# Patient Record
Sex: FEMALE | Race: WHITE | NOT HISPANIC OR LATINO | Employment: OTHER | URBAN - METROPOLITAN AREA
[De-identification: names, ages, dates, MRNs, and addresses within clinical notes are randomized per-mention and may not be internally consistent; named-entity substitution may affect disease eponyms.]

---

## 2021-12-14 ENCOUNTER — OFFICE VISIT (OUTPATIENT)
Dept: GASTROENTEROLOGY | Facility: CLINIC | Age: 66
End: 2021-12-14

## 2021-12-14 ENCOUNTER — TELEPHONE (OUTPATIENT)
Dept: GASTROENTEROLOGY | Facility: CLINIC | Age: 66
End: 2021-12-14

## 2021-12-14 VITALS — HEIGHT: 64 IN | TEMPERATURE: 97.2 F | BODY MASS INDEX: 41.32 KG/M2 | WEIGHT: 242 LBS

## 2021-12-14 DIAGNOSIS — Z91.09 OTHER ALLERGY STATUS, OTHER THAN TO DRUGS AND BIOLOGICAL SUBSTANCES: ICD-10-CM

## 2021-12-14 DIAGNOSIS — R15.2 INCONTINENCE OF FECES WITH FECAL URGENCY: ICD-10-CM

## 2021-12-14 DIAGNOSIS — R10.10 PAIN OF UPPER ABDOMEN: ICD-10-CM

## 2021-12-14 DIAGNOSIS — R15.9 INCONTINENCE OF FECES WITH FECAL URGENCY: ICD-10-CM

## 2021-12-14 DIAGNOSIS — R19.7 DIARRHEA, UNSPECIFIED TYPE: ICD-10-CM

## 2021-12-14 DIAGNOSIS — R68.81 EARLY SATIETY: ICD-10-CM

## 2021-12-14 DIAGNOSIS — R19.4 CHANGE IN BOWEL HABITS: Primary | ICD-10-CM

## 2021-12-14 PROCEDURE — 99204 OFFICE O/P NEW MOD 45 MIN: CPT | Performed by: NURSE PRACTITIONER

## 2021-12-14 RX ORDER — PRIMIDONE 50 MG/1
100 TABLET ORAL NIGHTLY
COMMUNITY
Start: 2021-11-15

## 2021-12-14 RX ORDER — FLUOXETINE HYDROCHLORIDE 40 MG/1
40 CAPSULE ORAL DAILY
COMMUNITY
Start: 2021-10-05

## 2021-12-14 RX ORDER — ATORVASTATIN CALCIUM 40 MG/1
40 TABLET, FILM COATED ORAL DAILY
COMMUNITY
Start: 2021-10-02

## 2021-12-14 RX ORDER — CYANOCOBALAMIN 1000 UG/ML
1000 INJECTION, SOLUTION INTRAMUSCULAR; SUBCUTANEOUS
COMMUNITY
Start: 2021-09-17

## 2021-12-14 RX ORDER — CLOPIDOGREL BISULFATE 75 MG/1
75 TABLET ORAL DAILY
COMMUNITY
Start: 2021-12-09

## 2021-12-14 RX ORDER — GABAPENTIN 100 MG/1
100 CAPSULE ORAL DAILY
COMMUNITY
Start: 2021-11-15

## 2021-12-14 NOTE — TELEPHONE ENCOUNTER
anastasia Azar for 01/04 arrive at [facility],egd/patient procedure packet was given to pt in office on 12/14 pt was advised time of arrival is subject to change, [facility] will call for w/finale time

## 2021-12-14 NOTE — PROGRESS NOTES
Chief Complaint   Patient presents with   • Diarrhea       HPI    Annabel Michaels is a  66 y.o. female here to establish care as a new patient for complaints of abdominal pain with change in bowel habits and fecal incontinence.    This patient will also follow with Dr. Cannon.    Patient reports approximately 4 months of change in bowel habits with episodic diarrhea diarrhea becoming more frequent.  There is lower abdominal discomfort characterized as a gas bloating aching type sensation that comes and goes.  Occasional fecal incontinence.    She experiences epigastric pain, early satiety, and heartburn.  Some relief with OTC PPI therapy.    She is diabetic and reports poor glycemic control.    She has a history of gallbladder removal.    She has never had an endoscopic examination.    She is on Plavix for history of coronary artery disease and CVA.    Past Medical History:   Diagnosis Date   • Coronary artery disease    • CVA (cerebral vascular accident) (HCC)    • Diverticulitis of colon    • Hyperlipidemia    • Hypertension    • Irritable bowel syndrome        Past Surgical History:   Procedure Laterality Date   • CATARACT EXTRACTION     •  SECTION      x 3   • CHOLECYSTECTOMY     • KNEE ARTHROSCOPY     • WISDOM TOOTH EXTRACTION         Scheduled Meds:     Continuous Infusions: No current facility-administered medications for this visit.      PRN Meds:     Allergies   Allergen Reactions   • Penicillins Hives and Swelling       Social History     Socioeconomic History   • Marital status:    Tobacco Use   • Smoking status: Former Smoker     Packs/day: 0.50     Types: Cigarettes     Start date:      Quit date: 2016     Years since quittin.9   • Smokeless tobacco: Never Used   Substance and Sexual Activity   • Alcohol use: Yes     Comment: seldom   • Drug use: Never       Family History   Problem Relation Age of Onset   • Irritable bowel syndrome Mother    • Diverticulitis Sister    •  Diverticulitis Brother        Review of Systems   Constitutional: Negative for activity change, appetite change, fatigue, fever and unexpected weight change.   HENT: Negative for trouble swallowing.    Eyes: Negative.    Respiratory: Negative.  Negative for apnea, cough, choking, chest tightness, shortness of breath and wheezing.    Cardiovascular: Negative.  Negative for chest pain, palpitations and leg swelling.   Gastrointestinal: Positive for abdominal pain and diarrhea. Negative for abdominal distention, anal bleeding, blood in stool, constipation, nausea, rectal pain and vomiting.   Endocrine: Negative.    Genitourinary: Negative.    Musculoskeletal: Negative.    Allergic/Immunologic: Negative.    Neurological: Negative.    Hematological: Negative.    Psychiatric/Behavioral: Negative.        Vitals:    12/14/21 1430   Temp: 97.2 °F (36.2 °C)       Physical Exam  Constitutional:       Appearance: She is well-developed.   Abdominal:      General: Bowel sounds are normal. There is no distension.      Palpations: Abdomen is soft. There is no mass.      Tenderness: There is no abdominal tenderness. There is no guarding.      Hernia: No hernia is present.   Skin:     General: Skin is warm and dry.      Capillary Refill: Capillary refill takes less than 2 seconds.   Neurological:      Mental Status: She is alert and oriented to person, place, and time.   Psychiatric:         Behavior: Behavior normal.     Assessment    Diagnoses and all orders for this visit:    1. Change in bowel habits (Primary)  Overview:  Added automatically from request for surgery [id number]    Orders:  -     Case Request; Standing  -     Case Request  -     CBC & Differential  -     Comprehensive Metabolic Panel  -     C-reactive Protein  -     Sedimentation Rate  -     Celiac Comprehensive Panel  -     Food Allergy Profile  -     TSH  -     Stool Culture (Reference Lab) - Stool, Per Rectum    2. Pain of upper abdomen  Overview:  Added  automatically from request for surgery [id number]    Orders:  -     Case Request; Standing  -     Case Request    3. Early satiety  Overview:  Added automatically from request for surgery [id number]    Orders:  -     Case Request; Standing  -     Case Request    4. Incontinence of feces with fecal urgency  Overview:  Added automatically from request for surgery [id number]    Orders:  -     Case Request; Standing  -     Case Request  -     Clostridium Difficile Toxin, PCR - Stool, Per Rectum  -     Calprotectin, Fecal - Stool, Per Rectum  -     Pancreatic Elastase, Fecal - Stool, Per Rectum    5. Other allergy status, other than to drugs and biological substances   -     Food Allergy Profile    6. Diarrhea, unspecified type  -     Clostridium Difficile Toxin, PCR - Stool, Per Rectum  -     Calprotectin, Fecal - Stool, Per Rectum  -     Pancreatic Elastase, Fecal - Stool, Per Rectum  -     Stool Culture (Reference Lab) - Stool, Per Rectum       Plan    Pleasant 66-year-old female seen today with multiple GI complaints.  Recommend bidirectional endoscopic evaluation rule out celiac disease, inflammatory bowel disease, microscopic colitis, versus irritable bowel syndrome.  Obtain clearance from prescribing provider to hold Plavix prior to endoscopic examination.  Stool testing to include C. difficile, stool culture (prefer GI PCR but not covered on insurance), fecal calprotectin, and pancreatic elastase.  Start Watts colon health probiotics.  Continue OTC PPI therapy.  Additional labs as above.  Further recommendations and follow-up pending the aforementioned work-up.         MICHAEL Lemon  [facility]  [address] [place] [address]  Office: [phone]

## 2021-12-15 ENCOUNTER — TELEPHONE (OUTPATIENT)
Dept: GASTROENTEROLOGY | Facility: CLINIC | Age: 66
End: 2021-12-15

## 2021-12-15 LAB
ALBUMIN SERPL-MCNC: 3.8 G/DL (ref 3.8–4.8)
ALBUMIN/GLOB SERPL: 1.6 {RATIO} (ref 1.2–2.2)
ALP SERPL-CCNC: 161 IU/L (ref 44–121)
ALT SERPL-CCNC: 10 IU/L (ref 0–32)
AST SERPL-CCNC: 13 IU/L (ref 0–40)
BASOPHILS # BLD AUTO: 0 X10E3/UL (ref 0–0.2)
BASOPHILS NFR BLD AUTO: 1 %
BILIRUB SERPL-MCNC: 0.5 MG/DL (ref 0–1.2)
BUN SERPL-MCNC: 10 MG/DL (ref 8–27)
BUN/CREAT SERPL: 16 (ref 12–28)
CALCIUM SERPL-MCNC: 9.2 MG/DL (ref 8.7–10.3)
CHLORIDE SERPL-SCNC: 94 MMOL/L (ref 96–106)
CO2 SERPL-SCNC: 27 MMOL/L (ref 20–29)
CREAT SERPL-MCNC: 0.63 MG/DL (ref 0.57–1)
CRP SERPL-MCNC: 4 MG/L (ref 0–10)
ENDOMYSIUM IGA SER QL: NEGATIVE
EOSINOPHIL # BLD AUTO: 0.2 X10E3/UL (ref 0–0.4)
EOSINOPHIL NFR BLD AUTO: 2 %
ERYTHROCYTE [DISTWIDTH] IN BLOOD BY AUTOMATED COUNT: 13.3 % (ref 11.7–15.4)
ERYTHROCYTE [SEDIMENTATION RATE] IN BLOOD BY WESTERGREN METHOD: 14 MM/HR (ref 0–40)
GLIADIN PEPTIDE IGA SER-ACNC: 5 UNITS (ref 0–19)
GLIADIN PEPTIDE IGG SER-ACNC: 2 UNITS (ref 0–19)
GLOBULIN SER CALC-MCNC: 2.4 G/DL (ref 1.5–4.5)
GLUCOSE SERPL-MCNC: 540 MG/DL (ref 65–99)
HCT VFR BLD AUTO: 44.5 % (ref 34–46.6)
HGB BLD-MCNC: 13.8 G/DL (ref 11.1–15.9)
IGA SERPL-MCNC: 291 MG/DL (ref 87–352)
IMM GRANULOCYTES # BLD AUTO: 0 X10E3/UL (ref 0–0.1)
IMM GRANULOCYTES NFR BLD AUTO: 0 %
LYMPHOCYTES # BLD AUTO: 2.1 X10E3/UL (ref 0.7–3.1)
LYMPHOCYTES NFR BLD AUTO: 24 %
MCH RBC QN AUTO: 27.4 PG (ref 26.6–33)
MCHC RBC AUTO-ENTMCNC: 31 G/DL (ref 31.5–35.7)
MCV RBC AUTO: 88 FL (ref 79–97)
MONOCYTES # BLD AUTO: 0.4 X10E3/UL (ref 0.1–0.9)
MONOCYTES NFR BLD AUTO: 5 %
NEUTROPHILS # BLD AUTO: 5.9 X10E3/UL (ref 1.4–7)
NEUTROPHILS NFR BLD AUTO: 68 %
PLATELET # BLD AUTO: 320 X10E3/UL (ref 150–450)
POTASSIUM SERPL-SCNC: 4.4 MMOL/L (ref 3.5–5.2)
PROT SERPL-MCNC: 6.2 G/DL (ref 6–8.5)
RBC # BLD AUTO: 5.04 X10E6/UL (ref 3.77–5.28)
SODIUM SERPL-SCNC: 135 MMOL/L (ref 134–144)
TSH SERPL DL<=0.005 MIU/L-ACNC: 1.88 UIU/ML (ref 0.45–4.5)
TTG IGA SER-ACNC: <2 U/ML (ref 0–3)
TTG IGG SER-ACNC: <2 U/ML (ref 0–5)
WBC # BLD AUTO: 8.6 X10E3/UL (ref 3.4–10.8)

## 2021-12-15 NOTE — TELEPHONE ENCOUNTER
Was able to obtain pt correct phone number from pt's pharmacy.  Phone number updated in chart.    Called pt and advised of Dr. Gates's note.  Pt verb understanding.  Pt states she normally checks it at home but has been out of the testing strips.  Advised pt to call her pcp ASAP and to go to ER if she develops any worsening symptoms, pt verb understanding.

## 2021-12-15 NOTE — TELEPHONE ENCOUNTER
Attempted to call pt at phone number listed.  Was advised it was not the number of the person we are trying to reach.  Phone number removed from pt chart.  No other information (pcp, care everywhere, etc.) listed in patient chart.  IM sent to Mai

## 2021-12-15 NOTE — TELEPHONE ENCOUNTER
Can either of you ladies contact this patient's primary care. She has a glucose over 500 on labs drawn yesterday. Contact number in the chart is incorrect. Just try to get a hold of her to let her know.

## 2021-12-16 ENCOUNTER — TELEPHONE (OUTPATIENT)
Dept: GASTROENTEROLOGY | Facility: CLINIC | Age: 66
End: 2021-12-16

## 2021-12-16 NOTE — TELEPHONE ENCOUNTER
----- Message from MICHAEL Lemon sent at 12/15/2021  3:58 PM EST -----  We need clearance from patient's prescribing provider to hold Plavix prior to endoscopic examination.

## 2021-12-16 NOTE — TELEPHONE ENCOUNTER
Called pt, rec'd name of MD that prescribes Plavix.    Msg sent to Dr Sarath Varghese in [place], [place].  [phone], fax .

## 2021-12-17 LAB — C DIFF TOX GENS STL QL NAA+PROBE: NEGATIVE

## 2021-12-17 NOTE — PROGRESS NOTES
Please inform the patient that lab work shows no evidence of celiac disease, no anemia, one of her liver function tests is elevated recommend dedicated liver ultrasound.      Normal thyroid function.  Did she follow-up with her primary care regarding elevated glucose? Please complete stool testing.

## 2021-12-17 NOTE — TELEPHONE ENCOUNTER
Called pt and advised of Dr. Cannon's note.  Pt verb understanding and states she will hold her Plavix 5 days prior to procedure.

## 2021-12-17 NOTE — TELEPHONE ENCOUNTER
Per Dr. Cannon:    A letter from the cardiologist says she could hold her Plavix 5 days before procedure.  Please let her know her procedures on January 4

## 2021-12-18 LAB
CLAM IGE QN: <0.1 KU/L
CODFISH IGE QN: <0.1 KU/L
CONV CLASS DESCRIPTION: NORMAL
CORN IGE QN: <0.1 KU/L
COW MILK IGE QN: <0.1 KU/L
EGG WHITE IGE QN: <0.1 KU/L
PEANUT IGE QN: <0.1 KU/L
SCALLOP IGE QN: <0.1 KU/L
SESAME SEED IGE QN: <0.1 KU/L
SHRIMP IGE QN: <0.1 KU/L
SOYBEAN IGE QN: <0.1 KU/L
WALNUT IGE QN: <0.1 KU/L
WHEAT IGE QN: <0.1 KU/L

## 2021-12-19 LAB
BACTERIA SPEC CULT: NORMAL
BACTERIA SPEC CULT: NORMAL
CAMPYLOBACTER STL CULT: NORMAL
E COLI SXT STL QL IA: NEGATIVE
SALM + SHIG STL CULT: NORMAL

## 2021-12-20 ENCOUNTER — TELEPHONE (OUTPATIENT)
Dept: GASTROENTEROLOGY | Facility: CLINIC | Age: 66
End: 2021-12-20

## 2021-12-20 DIAGNOSIS — R79.89 ELEVATED LIVER FUNCTION TESTS: Primary | ICD-10-CM

## 2021-12-20 LAB — ELASTASE PANC STL-MCNT: 305 UG ELAST./G

## 2021-12-20 NOTE — TELEPHONE ENCOUNTER
Called pt and advised of Anat LINDSAY's note.  Pt verbalized understanding.    Pt has contacted PCP regarding elevated glucose. Pt states she has completed stool sample.     Pt agreeable to liver US.  Order placed. Msg sent to NEFTALI Coppola to cosign.

## 2021-12-20 NOTE — TELEPHONE ENCOUNTER
----- Message from MICHAEL Lemon sent at 12/17/2021  1:39 PM EST -----  Please inform the patient that lab work shows no evidence of celiac disease, no anemia, one of her liver function tests is elevated recommend dedicated liver ultrasound.      Normal thyroid function.  Did she follow-up with her primary care regarding elevated glucose? Please complete stool testing.

## 2021-12-21 LAB — CALPROTECTIN STL-MCNT: 21 UG/G (ref 0–120)

## 2021-12-29 ENCOUNTER — TRANSCRIBE ORDERS (OUTPATIENT)
Dept: ADMINISTRATIVE | Facility: HOSPITAL | Age: 66
End: 2021-12-29

## 2021-12-29 DIAGNOSIS — Z01.818 OTHER SPECIFIED PRE-OPERATIVE EXAMINATION: Primary | ICD-10-CM

## 2022-01-03 ENCOUNTER — LAB (OUTPATIENT)
Dept: LAB | Facility: HOSPITAL | Age: 67
End: 2022-01-03

## 2022-01-03 DIAGNOSIS — Z01.818 OTHER SPECIFIED PRE-OPERATIVE EXAMINATION: ICD-10-CM

## 2022-01-03 LAB — SARS-COV-2 ORF1AB RESP QL NAA+PROBE: NOT DETECTED

## 2022-01-03 PROCEDURE — U0005 INFEC AGEN DETEC AMPLI PROBE: HCPCS

## 2022-01-03 PROCEDURE — C9803 HOPD COVID-19 SPEC COLLECT: HCPCS

## 2022-01-03 PROCEDURE — U0004 COV-19 TEST NON-CDC HGH THRU: HCPCS

## 2022-01-04 ENCOUNTER — ANESTHESIA EVENT (OUTPATIENT)
Dept: GASTROENTEROLOGY | Facility: HOSPITAL | Age: 67
End: 2022-01-04

## 2022-01-04 ENCOUNTER — HOSPITAL ENCOUNTER (OUTPATIENT)
Facility: HOSPITAL | Age: 67
Setting detail: HOSPITAL OUTPATIENT SURGERY
Discharge: HOME | End: 2022-01-04
Attending: INTERNAL MEDICINE | Admitting: INTERNAL MEDICINE

## 2022-01-04 ENCOUNTER — ANESTHESIA (OUTPATIENT)
Dept: GASTROENTEROLOGY | Facility: HOSPITAL | Age: 67
End: 2022-01-04

## 2022-01-04 VITALS
WEIGHT: 237.4 LBS | SYSTOLIC BLOOD PRESSURE: 159 MMHG | RESPIRATION RATE: 16 BRPM | HEART RATE: 79 BPM | HEIGHT: 64 IN | BODY MASS INDEX: 40.53 KG/M2 | TEMPERATURE: 98.3 F | DIASTOLIC BLOOD PRESSURE: 83 MMHG | OXYGEN SATURATION: 96 %

## 2022-01-04 DIAGNOSIS — R10.10 PAIN OF UPPER ABDOMEN: ICD-10-CM

## 2022-01-04 DIAGNOSIS — R15.2 INCONTINENCE OF FECES WITH FECAL URGENCY: ICD-10-CM

## 2022-01-04 DIAGNOSIS — R68.81 EARLY SATIETY: ICD-10-CM

## 2022-01-04 DIAGNOSIS — R19.4 CHANGE IN BOWEL HABITS: ICD-10-CM

## 2022-01-04 DIAGNOSIS — R15.9 INCONTINENCE OF FECES WITH FECAL URGENCY: ICD-10-CM

## 2022-01-04 LAB — GLUCOSE BLDC GLUCOMTR-MCNC: 304 MG/DL (ref 70–130)

## 2022-01-04 PROCEDURE — 88305 TISSUE EXAM BY PATHOLOGIST: CPT | Performed by: INTERNAL MEDICINE

## 2022-01-04 PROCEDURE — 82962 GLUCOSE BLOOD TEST: CPT

## 2022-01-04 PROCEDURE — 45380 COLONOSCOPY AND BIOPSY: CPT | Performed by: INTERNAL MEDICINE

## 2022-01-04 PROCEDURE — 45385 COLONOSCOPY W/LESION REMOVAL: CPT | Performed by: INTERNAL MEDICINE

## 2022-01-04 PROCEDURE — 43239 EGD BIOPSY SINGLE/MULTIPLE: CPT | Performed by: INTERNAL MEDICINE

## 2022-01-04 PROCEDURE — 25010000002 PROPOFOL 10 MG/ML EMULSION: Performed by: ANESTHESIOLOGY

## 2022-01-04 RX ORDER — ONDANSETRON 2 MG/ML
4 INJECTION INTRAMUSCULAR; INTRAVENOUS ONCE AS NEEDED
Status: DISCONTINUED | OUTPATIENT
Start: 2022-01-04 | End: 2022-01-04 | Stop reason: HOSPADM

## 2022-01-04 RX ORDER — PROPOFOL 10 MG/ML
VIAL (ML) INTRAVENOUS AS NEEDED
Status: DISCONTINUED | OUTPATIENT
Start: 2022-01-04 | End: 2022-01-04 | Stop reason: SURG

## 2022-01-04 RX ORDER — SODIUM CHLORIDE 0.9 % (FLUSH) 0.9 %
10 SYRINGE (ML) INJECTION AS NEEDED
Status: DISCONTINUED | OUTPATIENT
Start: 2022-01-04 | End: 2022-01-04 | Stop reason: HOSPADM

## 2022-01-04 RX ORDER — EPHEDRINE SULFATE 50 MG/ML
INJECTION, SOLUTION INTRAVENOUS AS NEEDED
Status: DISCONTINUED | OUTPATIENT
Start: 2022-01-04 | End: 2022-01-04 | Stop reason: SURG

## 2022-01-04 RX ORDER — LISINOPRIL 20 MG/1
20 TABLET ORAL DAILY
COMMUNITY

## 2022-01-04 RX ORDER — PANTOPRAZOLE SODIUM 40 MG/1
40 TABLET, DELAYED RELEASE ORAL 2 TIMES DAILY
Qty: 60 TABLET | Refills: 3 | Status: SHIPPED | OUTPATIENT
Start: 2022-01-04 | End: 2022-05-06

## 2022-01-04 RX ORDER — PROPOFOL 10 MG/ML
VIAL (ML) INTRAVENOUS CONTINUOUS PRN
Status: DISCONTINUED | OUTPATIENT
Start: 2022-01-04 | End: 2022-01-04 | Stop reason: SURG

## 2022-01-04 RX ORDER — SODIUM CHLORIDE 0.9 % (FLUSH) 0.9 %
3 SYRINGE (ML) INJECTION EVERY 12 HOURS SCHEDULED
Status: DISCONTINUED | OUTPATIENT
Start: 2022-01-04 | End: 2022-01-04 | Stop reason: HOSPADM

## 2022-01-04 RX ORDER — SODIUM CHLORIDE, SODIUM LACTATE, POTASSIUM CHLORIDE, CALCIUM CHLORIDE 600; 310; 30; 20 MG/100ML; MG/100ML; MG/100ML; MG/100ML
30 INJECTION, SOLUTION INTRAVENOUS CONTINUOUS PRN
Status: DISCONTINUED | OUTPATIENT
Start: 2022-01-04 | End: 2022-01-04 | Stop reason: HOSPADM

## 2022-01-04 RX ORDER — LIDOCAINE HYDROCHLORIDE 20 MG/ML
INJECTION, SOLUTION INFILTRATION; PERINEURAL AS NEEDED
Status: DISCONTINUED | OUTPATIENT
Start: 2022-01-04 | End: 2022-01-04 | Stop reason: SURG

## 2022-01-04 RX ADMIN — PROPOFOL 30 MG: 10 INJECTION, EMULSION INTRAVENOUS at 10:22

## 2022-01-04 RX ADMIN — EPHEDRINE SULFATE 10 MG: 50 INJECTION INTRAVENOUS at 10:37

## 2022-01-04 RX ADMIN — PROPOFOL 40 MG: 10 INJECTION, EMULSION INTRAVENOUS at 10:17

## 2022-01-04 RX ADMIN — PROPOFOL 100 MG: 10 INJECTION, EMULSION INTRAVENOUS at 10:12

## 2022-01-04 RX ADMIN — LIDOCAINE HYDROCHLORIDE 50 MG: 20 INJECTION, SOLUTION INFILTRATION; PERINEURAL at 10:12

## 2022-01-04 RX ADMIN — Medication 200 MCG/KG/MIN: at 10:12

## 2022-01-04 RX ADMIN — SODIUM CHLORIDE, POTASSIUM CHLORIDE, SODIUM LACTATE AND CALCIUM CHLORIDE 30 ML/HR: 600; 310; 30; 20 INJECTION, SOLUTION INTRAVENOUS at 08:59

## 2022-01-04 NOTE — ANESTHESIA POSTPROCEDURE EVALUATION
"Patient: Sara Michaels    Procedure Summary     Date: 01/04/22 Room / Location: [facility] ENDOSCOPY 8 / [facility] ENDOSCOPY    Anesthesia Start: 1008 Anesthesia Stop: 1045    Procedures:       ESOPHAGOGASTRODUODENOSCOPY with cold bx (N/A Esophagus)      COLONOSCOPY to cecum with cold polypectomies and cold bx (N/A ) Diagnosis:       Pain of upper abdomen      Early satiety      Change in bowel habits      Incontinence of feces with fecal urgency      (Pain of upper abdomen [R10.10])      (Early satiety [R68.81])      (Change in bowel habits [R19.4])      (Incontinence of feces with fecal urgency [R15.9, R15.2])    Surgeons: Arcenio Cannon MD Provider: Bonny Altman MD    Anesthesia Type: MAC ASA Status: 3          Anesthesia Type: MAC    Vitals  Vitals Value Taken Time   /83 01/04/22 1103   Temp     Pulse 79 01/04/22 1103   Resp 16 01/04/22 1103   SpO2 96 % 01/04/22 1103           Post Anesthesia Care and Evaluation    Patient location during evaluation: PHASE II  Patient participation: complete - patient participated  Level of consciousness: awake  Pain management: adequate  Airway patency: patent  Anesthetic complications: No anesthetic complications    Cardiovascular status: acceptable  Respiratory status: acceptable  Hydration status: acceptable    Comments: /83 (BP Location: Left leg, Patient Position: Lying)   Pulse 79   Temp 36.8 °C (98.3 °F) (Oral)   Resp 16   Ht 162.6 cm (64\")   Wt 108 kg (237 lb 6.4 oz)   SpO2 96%   BMI 40.75 kg/m²       "

## 2022-01-04 NOTE — NURSING NOTE
bs-304mg/dl. Informed Dr. Altman. Pt had 540mg/dl in Dec.Pt missed her insulin shots for 2 days. No new orders made. Pt denies any symptoms at this time.

## 2022-01-04 NOTE — ANESTHESIA PREPROCEDURE EVALUATION
Anesthesia Evaluation     Patient summary reviewed and Nursing notes reviewed   NPO Solid Status: > 8 hours  NPO Liquid Status: > 2 hours           Airway   Mallampati: II  Dental - normal exam     Pulmonary - normal exam   (+) a smoker Former,   Cardiovascular - normal exam    ECG reviewed    (+) hypertension, CAD, hyperlipidemia,       Neuro/Psych  (+) CVA,     GI/Hepatic/Renal/Endo    (+) morbid obesity,  diabetes mellitus poorly controlled,     Musculoskeletal     Abdominal   (+) obese,    Substance History      OB/GYN          Other                      Anesthesia Plan    ASA 3     MAC       Anesthetic plan, all risks, benefits, and alternatives have been provided, discussed and informed consent has been obtained with: patient.

## 2022-01-05 LAB
LAB AP CASE REPORT: NORMAL
PATH REPORT.FINAL DX SPEC: NORMAL
PATH REPORT.GROSS SPEC: NORMAL

## 2022-01-07 ENCOUNTER — TELEPHONE (OUTPATIENT)
Dept: GASTROENTEROLOGY | Facility: CLINIC | Age: 67
End: 2022-01-07

## 2022-01-07 NOTE — TELEPHONE ENCOUNTER
----- Message from Arcenio Cannon MD sent at 1/7/2022  9:20 AM EST -----  No H. pylori or celiac diseaseTubular adenomasNo evidence of microscopic colitisColonoscopy recall 3 yearsContinue PPI for ulcers in the duodenumBegin Imodium over-the-counter twice a day to reduce the amount of diarrhea and incontinence.  Continue fiber therapyOffice visit Anat for further discussion in 6 weeks

## 2022-01-07 NOTE — TELEPHONE ENCOUNTER
Call to pt.  Advise per DR Cannon note.  Verb understanding.     F/u appt scheduled with DONNELL Martinez for 2/21 @ 11am.      Pt states has not heard from [facility] to arrange US ordered on 12/20.  Advise may contact them at [phone] to arrange.  States will do so.

## 2022-01-07 NOTE — PROGRESS NOTES
No H. pylori or celiac diseaseTubular adenomasNo evidence of microscopic colitisColonoscopy recall 3 yearsContinue PPI for ulcers in the duodenumBegin Imodium over-the-counter twice a day to reduce the amount of diarrhea and incontinence.  Continue fiber therapyOffice visit Anat for further discussion in 6 weeks

## 2022-02-07 ENCOUNTER — HOSPITAL ENCOUNTER (OUTPATIENT)
Dept: ULTRASOUND IMAGING | Facility: HOSPITAL | Age: 67
Discharge: HOME | End: 2022-02-07
Admitting: NURSE PRACTITIONER

## 2022-02-07 DIAGNOSIS — R79.89 ELEVATED LIVER FUNCTION TESTS: ICD-10-CM

## 2022-02-07 PROCEDURE — 76705 ECHO EXAM OF ABDOMEN: CPT

## 2022-02-09 ENCOUNTER — TELEPHONE (OUTPATIENT)
Dept: GASTROENTEROLOGY | Facility: CLINIC | Age: 67
End: 2022-02-09

## 2022-02-09 NOTE — TELEPHONE ENCOUNTER
----- Message from MICHAEL Lemon sent at 2/9/2022 11:38 AM EST -----  Please inform the patient that liver ultrasound shows some mild fatty liver.  We can discuss further when I see her in the office.

## 2022-02-09 NOTE — PROGRESS NOTES
Please inform the patient that liver ultrasound shows some mild fatty liver.  We can discuss further when I see her in the office.

## 2022-02-21 ENCOUNTER — OFFICE VISIT (OUTPATIENT)
Dept: GASTROENTEROLOGY | Facility: CLINIC | Age: 67
End: 2022-02-21

## 2022-02-21 VITALS — WEIGHT: 236.8 LBS | BODY MASS INDEX: 40.43 KG/M2 | TEMPERATURE: 97.1 F | HEIGHT: 64 IN

## 2022-02-21 DIAGNOSIS — K29.00 ACUTE SUPERFICIAL GASTRITIS WITHOUT HEMORRHAGE: Primary | ICD-10-CM

## 2022-02-21 DIAGNOSIS — Z86.010 PERSONAL HISTORY OF COLONIC POLYPS: ICD-10-CM

## 2022-02-21 DIAGNOSIS — K26.9 DUODENAL ULCER: ICD-10-CM

## 2022-02-21 DIAGNOSIS — R74.8 ELEVATED ALKALINE PHOSPHATASE LEVEL: ICD-10-CM

## 2022-02-21 DIAGNOSIS — K76.0 FATTY LIVER: ICD-10-CM

## 2022-02-21 PROCEDURE — 99214 OFFICE O/P EST MOD 30 MIN: CPT | Performed by: NURSE PRACTITIONER

## 2022-02-21 NOTE — PROGRESS NOTES
"Chief Complaint   Patient presents with   • EGD & C scope f/up       HPI    Sara Michaels is a  66 y.o. female here for a follow up visit for abdominal pain status post endoscopic examination.    This patient follows with Dr. Cannon and myself.    Recent liver ultrasound with mild hepatic steatosis.  ALP in .     Reviewed endoscopic examination dated 2022:    EGD w/ Acute gastritis and nonbleeding duodenal ulcer.  Use PPI therapy p.o. twice daily.  Colonoscopy w/ TA Polyps, diverticulosis, nonbleeding internal hemorrhoids.  Recall 3 years.  Pathology showed no evidence of H. pylori, celiac disease, or microscopic colitis.    On visit today she reports complete resolution of diarrhea and abdominal pain after endoscopic examination feels like PPI therapy at twice daily dosing of probiotics has \"worked wonders.\"  No rectal bleeding.  No dysphagia, odynophagia, nausea, vomiting reported on visit today.    She is diabetic and reports better glycemic control as of late.     She has a history of gallbladder removal.     She is on Plavix for history of coronary artery disease and CVA.    Past Medical History:   Diagnosis Date   • Coronary artery disease    • CVA (cerebral vascular accident) (HCC) 2016   • Diabetes mellitus (HCC)    • Diverticulitis of colon    • Dysphagia    • Heartburn    • Hyperlipidemia    • Hypertension    • Irritable bowel syndrome        Past Surgical History:   Procedure Laterality Date   • CATARACT EXTRACTION     •  SECTION      x 3   • CHOLECYSTECTOMY     • COLONOSCOPY     • COLONOSCOPY N/A 2022    Procedure: COLONOSCOPY to cecum with cold polypectomies and cold bx;  Surgeon: Arcenio Cannon MD;  Location: [facility] ENDOSCOPY;  Service: Gastroenterology;  Laterality: N/A;  PRE - diarrhea  POST - diverticulosis, polyps, lipoma, hemorrhoids   • ENDOSCOPY N/A 2022    Procedure: ESOPHAGOGASTRODUODENOSCOPY with cold bx;  Surgeon: Arcenio Cannon MD;  Location: [facility]" ENDOSCOPY;  Service: Gastroenterology;  Laterality: N/A;  PRE - epigastric pain  POST - duodenum ulceration, gastritis, gastric ulcer   • HYSTERECTOMY     • KNEE ARTHROSCOPY     • TONSILLECTOMY     • WISDOM TOOTH EXTRACTION         Scheduled Meds:     Continuous Infusions: No current facility-administered medications for this visit.      PRN Meds:     Allergies   Allergen Reactions   • Penicillins Hives and Swelling       Social History     Socioeconomic History   • Marital status:    Tobacco Use   • Smoking status: Former Smoker     Packs/day: 0.50     Types: Cigarettes     Start date:      Quit date:      Years since quittin.1   • Smokeless tobacco: Never Used   Substance and Sexual Activity   • Alcohol use: Yes     Comment: seldom   • Drug use: Never   • Sexual activity: Defer       Family History   Problem Relation Age of Onset   • Irritable bowel syndrome Mother    • Diverticulitis Sister    • Diverticulitis Brother    • Malig Hyperthermia Neg Hx        Review of Systems   Constitutional: Negative for activity change, appetite change, fatigue, fever and unexpected weight change.   HENT: Negative for trouble swallowing.    Respiratory: Negative for apnea, cough, choking, chest tightness, shortness of breath and wheezing.    Cardiovascular: Negative for chest pain, palpitations and leg swelling.   Gastrointestinal: Negative for abdominal distention, abdominal pain, anal bleeding, blood in stool, constipation, diarrhea, nausea, rectal pain and vomiting.       Vitals:    22 1100   Temp: 97.1 °F (36.2 °C)       Physical Exam  Constitutional:       Appearance: She is well-developed.   Abdominal:      General: Bowel sounds are normal. There is no distension.      Palpations: Abdomen is soft. There is no mass.      Tenderness: There is no abdominal tenderness. There is no guarding.      Hernia: No hernia is present.   Skin:     General: Skin is warm and dry.      Capillary Refill: Capillary  refill takes less than 2 seconds.   Neurological:      Mental Status: She is alert and oriented to person, place, and time.   Psychiatric:         Behavior: Behavior normal.     Assessment    Diagnoses and all orders for this visit:    1. Acute superficial gastritis without hemorrhage (Primary)    2. Duodenal ulcer    3. Elevated alkaline phosphatase level  -     Comprehensive Metabolic Panel  -     Alpha - 1 - Antitrypsin  -     DIANA  -     Anti-Smooth Muscle Antibody Titer  -     Ceruloplasmin  -     Gamma GT  -     Hemochromatosis Mutation  -     Hepatitis Panel, Acute  -     Mitochondrial Antibodies, M2  -     Alkaline Phosphatase, Isoenzymes  -     CAMARENA Fibrosure    4. Fatty liver    5. Personal history of colonic polyps         Plan    Kristina 66-year-old female seen today in follow-up status post endoscopic examination as outlined above.  Reviewed procedural findings with the patient as well as pathology and recommendations for follow-up in the future.  All questions were answered.    Continue twice daily dosing PPI therapy.  Continue probiotics.    Reviewed fatty liver disease and treatment options which include low-fat diet, increase cardiovascular exercise, and weight reduction to achieve an ideal BMI.  Educational handout provided to the patient above her conversation.  Recommend repeat CMP today with additional lab work to rule out autoimmune, viral, metabolic component as well as fractionated.    Return to clinic 3 months.         MICHAEL Lemon  [facility], [place], operated by [facility]  [address] [place] [address]  Office: [phone]

## 2022-02-22 LAB
A1AT SERPL-MCNC: 155 MG/DL (ref 101–187)
ACTIN IGG SERPL-ACNC: 9 UNITS (ref 0–19)
ALBUMIN SERPL-MCNC: 3.8 G/DL (ref 3.8–4.8)
ALBUMIN/GLOB SERPL: 1.7 {RATIO} (ref 1.2–2.2)
ALP SERPL-CCNC: 153 IU/L (ref 44–121)
ALT SERPL-CCNC: 9 IU/L (ref 0–32)
ANA SER QL: POSITIVE
AST SERPL-CCNC: 12 IU/L (ref 0–40)
BILIRUB SERPL-MCNC: 0.5 MG/DL (ref 0–1.2)
BUN SERPL-MCNC: 8 MG/DL (ref 8–27)
BUN/CREAT SERPL: 14 (ref 12–28)
CALCIUM SERPL-MCNC: 9.4 MG/DL (ref 8.7–10.3)
CERULOPLASMIN SERPL-MCNC: 31.3 MG/DL (ref 19–39)
CHLORIDE SERPL-SCNC: 90 MMOL/L (ref 96–106)
CO2 SERPL-SCNC: 27 MMOL/L (ref 20–29)
CREAT SERPL-MCNC: 0.59 MG/DL (ref 0.57–1)
DSDNA AB SER-ACNC: <1 IU/ML (ref 0–9)
GGT SERPL-CCNC: 25 IU/L (ref 0–60)
GLOBULIN SER CALC-MCNC: 2.3 G/DL (ref 1.5–4.5)
GLUCOSE SERPL-MCNC: 453 MG/DL (ref 65–99)
HAV IGM SERPL QL IA: NEGATIVE
HBV CORE IGM SERPL QL IA: NEGATIVE
HBV SURFACE AG SERPL QL IA: NEGATIVE
HCV AB S/CO SERPL IA: <0.1 S/CO RATIO (ref 0–0.9)
Lab: NORMAL
MITOCHONDRIA M2 IGG SER-ACNC: <20 UNITS (ref 0–20)
POTASSIUM SERPL-SCNC: 4.2 MMOL/L (ref 3.5–5.2)
PROT SERPL-MCNC: 6.1 G/DL (ref 6–8.5)
SODIUM SERPL-SCNC: 136 MMOL/L (ref 134–144)

## 2022-02-23 LAB
A2 MACROGLOB SERPL-MCNC: 249 MG/DL (ref 110–276)
ALP BONE CFR SERPL: 22 % (ref 14–68)
ALP INTEST CFR SERPL: 0 % (ref 0–18)
ALP LIVER CFR SERPL: 78 % (ref 18–85)
ALT SERPL W P-5'-P-CCNC: 8 IU/L (ref 0–40)
APO A-I SERPL-MCNC: 113 MG/DL (ref 116–209)
AST SERPL W P-5'-P-CCNC: 13 IU/L (ref 0–40)
BILIRUB SERPL-MCNC: 0.4 MG/DL (ref 0–1.2)
CHOLEST SERPL-MCNC: 125 MG/DL (ref 100–199)
FIBROSIS SCORING:: ABNORMAL
FIBROSIS STAGE SERPL QL: ABNORMAL
GGT SERPL-CCNC: 25 IU/L (ref 0–60)
GLUCOSE SERPL-MCNC: 469 MG/DL (ref 65–99)
HAPTOGLOB SERPL-MCNC: 238 MG/DL (ref 37–355)
INTERPRETATIONS: (REFERENCE): ABNORMAL
LABORATORY COMMENT REPORT: ABNORMAL
LIVER FIBR SCORE SERPL CALC.FIBROSURE: 0.3 (ref 0–0.21)
NASH SCORING (REFERENCE): ABNORMAL
NECROINFLAMMATORY ACT GRADE SERPL QL: ABNORMAL
NECROINFLAMMATORY ACT SCORE SERPL: 0.75
SERVICE CMNT-IMP: ABNORMAL
STEATOSIS GRADE (REFERENCE): ABNORMAL
STEATOSIS GRADING (REFERENCE): ABNORMAL
STEATOSIS SCORE (REFERENCE): 0.74 (ref 0–0.3)
TRIGL SERPL-MCNC: 121 MG/DL (ref 0–149)

## 2022-02-23 NOTE — PROGRESS NOTES
Please inform the patient that her blood sugar is 453 on lab work.  I want her to call her primary care provider right away to discuss glycemic control.  Once the rest of her liver work-up is in we will call her with the results.

## 2022-02-24 ENCOUNTER — TELEPHONE (OUTPATIENT)
Dept: GASTROENTEROLOGY | Facility: CLINIC | Age: 67
End: 2022-02-24

## 2022-02-24 NOTE — TELEPHONE ENCOUNTER
----- Message from MICHAEL Lemon sent at 2/23/2022  3:49 PM EST -----  Please inform the patient that her blood sugar is 453 on lab work.  I want her to call her primary care provider right away to discuss glycemic control.  Once the rest of her liver work-up is in we will call her with the results.

## 2022-02-24 NOTE — TELEPHONE ENCOUNTER
Called pt and advised of Anat LINDSAY's note.  Pt verbalized understanding.    Pt states she has already contacted her PCP and they are working on her blood sugars.

## 2022-02-25 LAB — HFE GENE MUT ANL BLD/T: NORMAL

## 2022-03-11 ENCOUNTER — TELEPHONE (OUTPATIENT)
Dept: GASTROENTEROLOGY | Facility: CLINIC | Age: 67
End: 2022-03-11

## 2022-03-11 NOTE — TELEPHONE ENCOUNTER
----- Message from MICHAEL Lemon sent at 3/10/2022  4:03 PM EST -----  Please inform the patient that liver work-up shows no evidence of autoimmune condition of the liver.  She does have a positive DIANA which is a nonspecific marker of other autoimmune conditions.  We recommend she return to her primary care provider to   work this up further.  Thanks BG

## 2022-05-06 RX ORDER — PANTOPRAZOLE SODIUM 40 MG/1
TABLET, DELAYED RELEASE ORAL
Qty: 60 TABLET | Refills: 3 | Status: SHIPPED | OUTPATIENT
Start: 2022-05-06

## 2022-05-23 ENCOUNTER — OFFICE VISIT (OUTPATIENT)
Dept: GASTROENTEROLOGY | Facility: CLINIC | Age: 67
End: 2022-05-23

## 2022-05-23 ENCOUNTER — HOSPITAL ENCOUNTER (OUTPATIENT)
Dept: GENERAL RADIOLOGY | Facility: HOSPITAL | Age: 67
Discharge: HOME | End: 2022-05-23
Admitting: NURSE PRACTITIONER

## 2022-05-23 VITALS
HEIGHT: 64 IN | WEIGHT: 235 LBS | SYSTOLIC BLOOD PRESSURE: 160 MMHG | TEMPERATURE: 96.3 F | DIASTOLIC BLOOD PRESSURE: 78 MMHG | HEART RATE: 67 BPM | BODY MASS INDEX: 40.12 KG/M2

## 2022-05-23 DIAGNOSIS — R19.8 ALTERNATING CONSTIPATION AND DIARRHEA: ICD-10-CM

## 2022-05-23 DIAGNOSIS — K21.9 GASTROESOPHAGEAL REFLUX DISEASE, UNSPECIFIED WHETHER ESOPHAGITIS PRESENT: ICD-10-CM

## 2022-05-23 DIAGNOSIS — K76.0 FATTY LIVER: ICD-10-CM

## 2022-05-23 DIAGNOSIS — R19.8 ALTERNATING CONSTIPATION AND DIARRHEA: Primary | ICD-10-CM

## 2022-05-23 DIAGNOSIS — Z86.010 PERSONAL HISTORY OF COLONIC POLYPS: ICD-10-CM

## 2022-05-23 PROCEDURE — 99214 OFFICE O/P EST MOD 30 MIN: CPT | Performed by: NURSE PRACTITIONER

## 2022-05-23 PROCEDURE — 74018 RADEX ABDOMEN 1 VIEW: CPT

## 2022-05-23 RX ORDER — LOPERAMIDE HYDROCHLORIDE 2 MG/1
2 CAPSULE ORAL AS NEEDED
COMMUNITY

## 2022-05-23 RX ORDER — TRAZODONE HYDROCHLORIDE 50 MG/1
50 TABLET ORAL NIGHTLY PRN
COMMUNITY
Start: 2022-03-08

## 2022-05-23 RX ORDER — INSULIN DETEMIR 100 [IU]/ML
INJECTION, SOLUTION SUBCUTANEOUS
COMMUNITY
Start: 2022-03-16

## 2022-05-23 RX ORDER — EZETIMIBE 10 MG/1
10 TABLET ORAL DAILY
COMMUNITY
Start: 2022-03-08

## 2022-05-23 RX ORDER — ASPIRIN 81 MG/1
81 TABLET ORAL DAILY
COMMUNITY

## 2022-05-23 NOTE — PROGRESS NOTES
Chief Complaint   Patient presents with   • Diarrhea       HPI    Sara Michaels is a  66 y.o. female here for a follow up visit for alternating diarrhea and constipation.    This patient follows with Dr. Cannon and myself.    History of fatty liver disease, acute gastritis, duodenal ulcer, TA polyps, diverticulosis.    On visit today she complains of several months of alternating diarrhea and constipation.  She can go 1 to 3 days without a bowel movement at all or past small-volume stools and then have episodes of explosive diarrhea with incontinence.  Frequent right-sided abdominal discomfort but denies overt abdominal pain.  No rectal bleeding.  When diarrhea is explosive she will take 1 or 2 tablets of Imodium which can lead to cyclical constipation.    No upper GI complaints.  Continues on Protonix 40 mg once daily.    She is diabetic and reports better glycemic control as of late.     She has a history of gallbladder removal.     She is on Plavix for history of coronary artery disease and CVA.     Past Medical History:   Diagnosis Date   • Coronary artery disease    • CVA (cerebral vascular accident) (HCC)    • Diabetes mellitus (HCC)    • Diverticulitis of colon    • Dysphagia    • Heartburn    • Hyperlipidemia    • Hypertension    • Irritable bowel syndrome        Past Surgical History:   Procedure Laterality Date   • CATARACT EXTRACTION     •  SECTION      x 3   • CHOLECYSTECTOMY     • COLONOSCOPY     • COLONOSCOPY N/A 2022    Procedure: COLONOSCOPY to cecum with cold polypectomies and cold bx;  Surgeon: Arcenio Cannon MD;  Location: [facility] ENDOSCOPY;  Service: Gastroenterology;  Laterality: N/A;  PRE - diarrhea  POST - diverticulosis, polyps, lipoma, hemorrhoids   • ENDOSCOPY N/A 2022    Procedure: ESOPHAGOGASTRODUODENOSCOPY with cold bx;  Surgeon: Arcenio Cannon MD;  Location: [facility] ENDOSCOPY;  Service: Gastroenterology;  Laterality: N/A;  PRE - epigastric pain  POST - duodenum  ulceration, gastritis, gastric ulcer   • HYSTERECTOMY     • KNEE ARTHROSCOPY     • TONSILLECTOMY     • WISDOM TOOTH EXTRACTION         Scheduled Meds:     Continuous Infusions: No current facility-administered medications for this visit.      PRN Meds:     Allergies   Allergen Reactions   • Penicillins Hives and Swelling       Social History     Socioeconomic History   • Marital status:    Tobacco Use   • Smoking status: Former Smoker     Packs/day: 0.50     Types: Cigarettes     Start date:      Quit date: 2016     Years since quittin.3   • Smokeless tobacco: Never Used   Substance and Sexual Activity   • Alcohol use: Yes     Comment: seldom   • Drug use: Never   • Sexual activity: Defer       Family History   Problem Relation Age of Onset   • Irritable bowel syndrome Mother    • Diverticulitis Sister    • Diverticulitis Brother    • Malig Hyperthermia Neg Hx        Review of Systems   Constitutional: Negative for activity change, appetite change, fatigue, fever and unexpected weight change.   HENT: Negative for trouble swallowing.    Respiratory: Negative for apnea, cough, choking, chest tightness, shortness of breath and wheezing.    Cardiovascular: Negative for chest pain, palpitations and leg swelling.   Gastrointestinal: Positive for constipation and diarrhea. Negative for abdominal distention, abdominal pain, anal bleeding, blood in stool, nausea, rectal pain and vomiting.       Vitals:    22 1325   BP: 160/78   Pulse: 67   Temp: 96.3 °F (35.7 °C)       Physical Exam  Constitutional:       Appearance: She is well-developed.   Abdominal:      General: Bowel sounds are normal. There is no distension.      Palpations: Abdomen is soft. There is no mass.      Tenderness: There is no abdominal tenderness. There is no guarding.      Hernia: No hernia is present.   Skin:     General: Skin is warm and dry.      Capillary Refill: Capillary refill takes less than 2 seconds.   Neurological:       Mental Status: She is alert and oriented to person, place, and time.   Psychiatric:         Behavior: Behavior normal.     Assessment    Diagnoses and all orders for this visit:    1. Alternating constipation and diarrhea (Primary)  -     XR Abdomen KUB; Future    2. Gastroesophageal reflux disease, unspecified whether esophagitis present    3. Fatty liver    4. Personal history of colonic polyps       Plan    Abdominal x-ray today to assess stool burden suspect overflow diarrhea  If found to be constipated consider low-dose Amitiza versus Trulance  Continue PPI therapy  Continue to work on weight reduction secondary to fatty liver disease  Colonoscopy for screening purposes 2025         MICHAEL Lemon  [facility]  [address] [place] [address]  Office: [phone]

## 2022-05-25 NOTE — PROGRESS NOTES
Please inform the patient that abdominal x-ray is consistent with constipation.  Recommend she start treatment for this with Trulance.  If she is agreeable I will send a prescription to her pharmacy.

## 2022-05-26 ENCOUNTER — TELEPHONE (OUTPATIENT)
Dept: GASTROENTEROLOGY | Facility: CLINIC | Age: 67
End: 2022-05-26

## 2022-05-26 NOTE — TELEPHONE ENCOUNTER
----- Message from MICHAEL Lemon sent at 5/25/2022  4:20 PM EDT -----  Please inform the patient that abdominal x-ray is consistent with constipation.  Recommend she start treatment for this with Trulance.  If she is agreeable I will send a prescription to her pharmacy.

## 2022-05-26 NOTE — TELEPHONE ENCOUNTER
Called pt and advised of Anat LINDSAY's note.  Pt verbalized understanding.    Pt is agreeable to start Trulance.      Msg sent to NEFTALI Coppola.

## 2022-05-27 ENCOUNTER — TELEPHONE (OUTPATIENT)
Dept: GASTROENTEROLOGY | Facility: CLINIC | Age: 67
End: 2022-05-27

## 2022-05-27 NOTE — TELEPHONE ENCOUNTER
Prior authorization submitted to Cover My Meds. Awaiting determination from insurance. Answered additional questions and the questions asked if the patient has tried and failed Lactulose and Linzess in the past. There is no documentation in the chart to support that. Patient has tried and failed Imodium in the past per chart.

## 2022-05-27 NOTE — TELEPHONE ENCOUNTER
Prior authorization was denied. No denial letter to print out so I have to wait on it to come in the fax queue.   Patient must try and fail Linzess and Lactulose    Cover My Meds info below:  Sara Michaels Winter: [id number] - [place] Case ID: [id number] - Rx #: [id number] help? Call us at [phone]  Outcome  Deniedtoday  The drug you asked for is non-formulary (not on [facility] list of preferred drugs). Before the drug can be covered, we need more information. Please ask your prescriber to explain to [facility] why the preferred drugs have not worked for your medical condition and/or would have bad side effects. If you have not tried the preferred drugs, including Linzess capsule and Lactulose, please talk to your health care provider about prescribing one of these for you. Some preferred drugs may require an additional review and approval by [facility]. Additionally, some alternative drugs listed may be the same drugs with different strengths or forms. Under certain cases, [facility] may only require trial and failure of one strength or form of that drug. This determination was based on the [facility] Non-Formulary Exceptions Coverage Policy.  Drug  Trulance 3MG tablets  Form  [employer] Electronic PA Form  Original Claim Info  569,MR

## 2023-04-10 ENCOUNTER — OFFICE VISIT (OUTPATIENT)
Dept: GASTROENTEROLOGY | Facility: CLINIC | Age: 68
End: 2023-04-10
Payer: COMMERCIAL

## 2023-04-10 VITALS
DIASTOLIC BLOOD PRESSURE: 83 MMHG | HEIGHT: 64 IN | TEMPERATURE: 97.1 F | SYSTOLIC BLOOD PRESSURE: 165 MMHG | HEART RATE: 63 BPM | WEIGHT: 243.2 LBS | BODY MASS INDEX: 41.52 KG/M2 | OXYGEN SATURATION: 93 %

## 2023-04-10 DIAGNOSIS — R19.8 ALTERNATING CONSTIPATION AND DIARRHEA: ICD-10-CM

## 2023-04-10 DIAGNOSIS — R10.30 LOWER ABDOMINAL PAIN: Primary | ICD-10-CM

## 2023-04-10 DIAGNOSIS — R10.33 PERIUMBILICAL ABDOMINAL PAIN: ICD-10-CM

## 2023-04-10 PROCEDURE — 99214 OFFICE O/P EST MOD 30 MIN: CPT | Performed by: NURSE PRACTITIONER

## 2023-04-10 RX ORDER — LISINOPRIL 20 MG/1
20 TABLET ORAL DAILY
COMMUNITY

## 2023-04-10 NOTE — PROGRESS NOTES
Chief Complaint   Patient presents with   • Abdominal Pain       HPI    Sara Michaels is a  67 y.o. female here for a follow up visit for irritable bowel syndrome with abdominal pain.    This patient follows with Dr. Cannon and myself    History of fatty liver disease, gastritis, duodenal ulcer, tubular adenomatous colon polyps along with diverticulosis.    Seen approximately 1 year ago for alternating diarrhea and constipation in keeping with history of irritable bowel syndrome.  KUB at the time consistent with constipation patient was treated with low-dose Linzess (Trulance denied by insurance).  She was then lost to follow-up.    Today she reports mid to lower abdominal pain that comes and goes described as a stabbing sensation.  She still having issues with managing her bowel pattern.  She admits she never started Linzess as previously recommended as she forgot.  She has issues with short-term memory loss due to prior stroke.  She describes bowel movements every 2 or 3 days with alternating loose/mushy stools and fecal incontinence.  Frequent nocturnal symptoms.  Denies rectal bleeding, fever or chills.  Associated nausea but no vomiting.    Reports excessive gas and bloating along with belching.  She feels like GERD is well controlled on once daily pantoprazole.  No dysphagia or odynophagia.  Her appetite comes and goes her weight is stable.  BMI 41.72.    Additional data reviewed:    EGD 2022 w/ Acute gastritis with nonbleeding duodenal ulcers.  C-scope  w/ TA polyps, diverticulosis, nonbleeding internal hemorrhoids.  Recall 3 years.    Past Medical History:   Diagnosis Date   • Coronary artery disease    • CVA (cerebral vascular accident)    • Diabetes mellitus    • Diverticulitis of colon    • Dysphagia    • Heartburn    • Hyperlipidemia    • Hypertension    • Irritable bowel syndrome        Past Surgical History:   Procedure Laterality Date   • CATARACT EXTRACTION     •  SECTION      x  3   • CHOLECYSTECTOMY     • COLONOSCOPY     • COLONOSCOPY N/A 2022    Procedure: COLONOSCOPY to cecum with cold polypectomies and cold bx;  Surgeon: Arcenio Cannon MD;  Location: [facility] ENDOSCOPY;  Service: Gastroenterology;  Laterality: N/A;  PRE - diarrhea  POST - diverticulosis, polyps, lipoma, hemorrhoids   • ENDOSCOPY N/A 2022    Procedure: ESOPHAGOGASTRODUODENOSCOPY with cold bx;  Surgeon: Arcenio Cannon MD;  Location: [facility] ENDOSCOPY;  Service: Gastroenterology;  Laterality: N/A;  PRE - epigastric pain  POST - duodenum ulceration, gastritis, gastric ulcer   • HYSTERECTOMY     • KNEE ARTHROSCOPY     • TONSILLECTOMY     • WISDOM TOOTH EXTRACTION         Scheduled Meds:     Continuous Infusions: No current facility-administered medications for this visit.      PRN Meds:     Allergies   Allergen Reactions   • Penicillins Hives and Swelling       Social History     Socioeconomic History   • Marital status:    Tobacco Use   • Smoking status: Former     Packs/day: 0.50     Types: Cigarettes     Start date:      Quit date:      Years since quittin.2   • Smokeless tobacco: Never   Substance and Sexual Activity   • Alcohol use: Yes     Comment: seldom   • Drug use: Never   • Sexual activity: Defer       Family History   Problem Relation Age of Onset   • Irritable bowel syndrome Mother    • Diverticulitis Sister    • Diverticulitis Brother    • Malig Hyperthermia Neg Hx        Review of Systems   Constitutional: Negative for activity change, appetite change, fatigue, fever and unexpected weight change.   HENT: Negative for trouble swallowing.    Respiratory: Negative for apnea, cough, choking, chest tightness, shortness of breath and wheezing.    Cardiovascular: Negative for chest pain, palpitations and leg swelling.   Gastrointestinal: Positive for abdominal pain, constipation, diarrhea and nausea. Negative for abdominal distention, anal bleeding, blood in stool, rectal pain and  vomiting.       Vitals:    04/10/23 0853   BP: 165/83   Pulse: 63   Temp: 97.1 °F (36.2 °C)   SpO2: 93%       Physical Exam  Constitutional:       Appearance: She is well-developed.   Abdominal:      General: Bowel sounds are normal. There is no distension.      Palpations: Abdomen is soft. There is no mass.      Tenderness: There is abdominal tenderness. There is no guarding.      Hernia: No hernia is present.          Comments: Tenderness noted in the area marked above   Skin:     General: Skin is warm and dry.      Capillary Refill: Capillary refill takes less than 2 seconds.   Neurological:      Mental Status: She is alert and oriented to person, place, and time.   Psychiatric:         Behavior: Behavior normal.     Assessment    Diagnoses and all orders for this visit:    1. Lower abdominal pain (Primary)  -     CT Abdomen Pelvis With Contrast; Future  -     CBC & Differential  -     Comprehensive Metabolic Panel    2. Periumbilical abdominal pain  -     CT Abdomen Pelvis With Contrast; Future  -     CBC & Differential  -     Comprehensive Metabolic Panel    3. Alternating constipation and diarrhea  -     CT Abdomen Pelvis With Contrast; Future  -     CBC & Differential  -     Comprehensive Metabolic Panel       Plan    Arrange CT of the abdomen pelvis with evaluation of patient's abdominal pain  Change current probiotic to Florastor she has been on [facility] colon health for at least 1 year  Lab work today as above  Pending imaging consider trial of Linzess  Further recommendations and follow-up pending aforementioned work-up         MICHAEL Lemon  [facility]  [address] [place] [address]  Office: [phone]

## 2023-04-11 LAB
ALBUMIN SERPL-MCNC: 3.6 G/DL (ref 3.5–5.2)
ALBUMIN/GLOB SERPL: 1.6 G/DL
ALP SERPL-CCNC: 137 U/L (ref 39–117)
ALT SERPL-CCNC: 8 U/L (ref 1–33)
AST SERPL-CCNC: 6 U/L (ref 1–32)
BASOPHILS # BLD AUTO: 0.05 10*3/MM3 (ref 0–0.2)
BASOPHILS NFR BLD AUTO: 0.7 % (ref 0–1.5)
BILIRUB SERPL-MCNC: 0.4 MG/DL (ref 0–1.2)
BUN SERPL-MCNC: 6 MG/DL (ref 8–23)
BUN/CREAT SERPL: 9.4 (ref 7–25)
CALCIUM SERPL-MCNC: 8.9 MG/DL (ref 8.6–10.5)
CHLORIDE SERPL-SCNC: 95 MMOL/L (ref 98–107)
CO2 SERPL-SCNC: 38.2 MMOL/L (ref 22–29)
CREAT SERPL-MCNC: 0.64 MG/DL (ref 0.57–1)
EGFRCR SERPLBLD CKD-EPI 2021: 96.4 ML/MIN/1.73
EOSINOPHIL # BLD AUTO: 0.21 10*3/MM3 (ref 0–0.4)
EOSINOPHIL NFR BLD AUTO: 2.8 % (ref 0.3–6.2)
ERYTHROCYTE [DISTWIDTH] IN BLOOD BY AUTOMATED COUNT: 13.3 % (ref 12.3–15.4)
GLOBULIN SER CALC-MCNC: 2.3 GM/DL
GLUCOSE SERPL-MCNC: 176 MG/DL (ref 65–99)
HCT VFR BLD AUTO: 37.7 % (ref 34–46.6)
HGB BLD-MCNC: 12.3 G/DL (ref 12–15.9)
IMM GRANULOCYTES # BLD AUTO: 0.02 10*3/MM3 (ref 0–0.05)
IMM GRANULOCYTES NFR BLD AUTO: 0.3 % (ref 0–0.5)
LYMPHOCYTES # BLD AUTO: 2 10*3/MM3 (ref 0.7–3.1)
LYMPHOCYTES NFR BLD AUTO: 26.3 % (ref 19.6–45.3)
MCH RBC QN AUTO: 27.3 PG (ref 26.6–33)
MCHC RBC AUTO-ENTMCNC: 32.6 G/DL (ref 31.5–35.7)
MCV RBC AUTO: 83.8 FL (ref 79–97)
MONOCYTES # BLD AUTO: 0.49 10*3/MM3 (ref 0.1–0.9)
MONOCYTES NFR BLD AUTO: 6.4 % (ref 5–12)
NEUTROPHILS # BLD AUTO: 4.83 10*3/MM3 (ref 1.7–7)
NEUTROPHILS NFR BLD AUTO: 63.5 % (ref 42.7–76)
NRBC BLD AUTO-RTO: 0 /100 WBC (ref 0–0.2)
PLATELET # BLD AUTO: 302 10*3/MM3 (ref 140–450)
POTASSIUM SERPL-SCNC: 3.4 MMOL/L (ref 3.5–5.2)
PROT SERPL-MCNC: 5.9 G/DL (ref 6–8.5)
RBC # BLD AUTO: 4.5 10*6/MM3 (ref 3.77–5.28)
SODIUM SERPL-SCNC: 138 MMOL/L (ref 136–145)
WBC # BLD AUTO: 7.6 10*3/MM3 (ref 3.4–10.8)

## 2023-04-18 ENCOUNTER — TELEPHONE (OUTPATIENT)
Dept: GASTROENTEROLOGY | Facility: CLINIC | Age: 68
End: 2023-04-18
Payer: MEDICARE

## 2023-04-18 NOTE — TELEPHONE ENCOUNTER
----- Message from MICHAEL Lemon sent at 4/14/2023 12:34 PM EDT -----  Please inform the patient lab work shows no anemia.  1 mildly elevated liver function test but this is improved when compared to 1 year ago.  Await imaging.  Blood sugar is elevated at 176 please have her discuss this further with her diabetic doctor  .

## 2023-04-18 NOTE — TELEPHONE ENCOUNTER
Called pt and advised of Anat LINDSAY's note.  Pt verbalized understanding.    CT scheduled for 4/20/2023.

## 2023-04-18 NOTE — TELEPHONE ENCOUNTER
Caller: Sara Michaels    Relationship to patient: Self    Best call back number: [phone]  CAN CALL ANY TIME.     Patient is needing: PATIENT NEEDING A CALL BACK FROM RODGER THOMPSON.

## 2023-04-20 ENCOUNTER — HOSPITAL ENCOUNTER (OUTPATIENT)
Dept: CT IMAGING | Facility: HOSPITAL | Age: 68
Discharge: HOME | End: 2023-04-20
Admitting: NURSE PRACTITIONER
Payer: MEDICARE

## 2023-04-20 DIAGNOSIS — R10.33 PERIUMBILICAL ABDOMINAL PAIN: ICD-10-CM

## 2023-04-20 DIAGNOSIS — R19.8 ALTERNATING CONSTIPATION AND DIARRHEA: ICD-10-CM

## 2023-04-20 DIAGNOSIS — R10.30 LOWER ABDOMINAL PAIN: ICD-10-CM

## 2023-04-20 PROCEDURE — 74177 CT ABD & PELVIS W/CONTRAST: CPT

## 2023-04-20 PROCEDURE — 82565 ASSAY OF CREATININE: CPT

## 2023-04-20 PROCEDURE — 25510000001 IOPAMIDOL 61 % SOLUTION: Performed by: NURSE PRACTITIONER

## 2023-04-20 PROCEDURE — 0 DIATRIZOATE MEGLUMINE & SODIUM PER 1 ML: Performed by: NURSE PRACTITIONER

## 2023-04-20 RX ADMIN — IOPAMIDOL 100 ML: 612 INJECTION, SOLUTION INTRAVENOUS at 18:00

## 2023-04-20 RX ADMIN — DIATRIZOATE MEGLUMINE AND DIATRIZOATE SODIUM 30 ML: 660; 100 LIQUID ORAL; RECTAL at 17:00

## 2023-04-21 LAB — CREAT BLDA-MCNC: 1 MG/DL (ref 0.6–1.3)

## 2023-04-21 NOTE — PROGRESS NOTES
Please let patient know that her CT scan was unremarkable.  She is currently on Trulance per medication list.  Anat mention Linzess.  Can you give her results and ask her if she is on Trulance and if this is helping her?  Then send message back to Anat for further recommendations.

## 2023-05-01 ENCOUNTER — TELEPHONE (OUTPATIENT)
Dept: GASTROENTEROLOGY | Facility: CLINIC | Age: 68
End: 2023-05-01
Payer: MEDICARE

## 2023-05-01 DIAGNOSIS — R15.9 INCONTINENCE OF FECES WITH FECAL URGENCY: ICD-10-CM

## 2023-05-01 DIAGNOSIS — R15.2 INCONTINENCE OF FECES WITH FECAL URGENCY: ICD-10-CM

## 2023-05-01 DIAGNOSIS — R10.30 LOWER ABDOMINAL PAIN: Primary | ICD-10-CM

## 2023-05-01 DIAGNOSIS — R19.4 CHANGE IN BOWEL HABITS: ICD-10-CM

## 2023-05-01 NOTE — TELEPHONE ENCOUNTER
Caller: Sara Michaels    Relationship: Self    Best call back number: [phone]    Caller requesting test results: YES    What test was performed: CT ABDOMEN PELVIS WITH CONTRAST    When was the test performed: 4/20/23    Where was the test performed: [facility]    Additional notes: PT WAS CALLING REGARDING RESULTS FROM CT SCAN. PT ALSO REPORTS THAT SHE IS STILL EXPERIENCING SYMPTOMS AS BEFORE AND WANTS TO KNOW WHAT THE FOLLOW UP IS. CALL BACK ANYTIME AND OK TO [facility].

## 2023-05-02 ENCOUNTER — TELEPHONE (OUTPATIENT)
Dept: GASTROENTEROLOGY | Facility: CLINIC | Age: 68
End: 2023-05-02

## 2023-05-02 NOTE — TELEPHONE ENCOUNTER
Per Karo:   Please let patient know that her CT scan was unremarkable.  She is currently on Trulance per medication list.  Anat mention Linzess.  Can you give her results and ask her if she is on Trulance and if this is helping her?  Then send message back to Anat for further recommendations.

## 2023-05-02 NOTE — TELEPHONE ENCOUNTER
Patient called. No answer. Left message advising as per Karo's note.   Advised to send an update via my chart or a returned phone call.

## 2023-05-02 NOTE — TELEPHONE ENCOUNTER
Recommend GI PCR, C. difficile along with fecal calprotectin.  Give trial of [facility] colon health probiotic in the interim.

## 2023-05-02 NOTE — TELEPHONE ENCOUNTER
Caller: Sara Michaels    Relationship to patient: Self    Best call back number: [phone]    Patient is needing: PT RETURNING PHONE CALL TO GET TEST RESULTS. UNABLE TO WARM TRANSFER. PLEASE CALL BACK ANYTIME AND ADVISE.

## 2023-05-02 NOTE — TELEPHONE ENCOUNTER
"Returned patient's phone call. She states she is not taking Trulance.   She states she never started Trulance. She states she continues to be incontinent of stool.   She states she has not had a formed bowel movement in months.   States she is passing watery stool 5-6 times a day and has to wear an adult diaper. \"I never know when I am going to have a BM\".     Advised will send an update to Anat. She verb understanding.   Update sent to Anat.       "

## 2023-05-06 LAB — C DIFF TOX A+B STL QL IA: NEGATIVE

## 2023-05-07 LAB

## 2023-05-09 ENCOUNTER — TELEPHONE (OUTPATIENT)
Dept: GASTROENTEROLOGY | Facility: CLINIC | Age: 68
End: 2023-05-09
Payer: MEDICARE

## 2023-05-09 NOTE — TELEPHONE ENCOUNTER
----- Message from MICHAEL Lemon sent at 5/9/2023 12:59 PM EDT -----  Spoke with the patient over the phone regarding C. difficile positive stool results.  Dificid has been sent to her pharmacy.  Reviewed dosing instructions with the patient.  Reviewed hand hygiene as well as bleach wipes.  Please have her come back an  d see me in 6 weeks to monitor for symptom improvement.  Thanks BG

## 2023-05-11 ENCOUNTER — TELEPHONE (OUTPATIENT)
Dept: GASTROENTEROLOGY | Facility: CLINIC | Age: 68
End: 2023-05-11
Payer: MEDICARE

## 2023-05-11 LAB — CALPROTECTIN STL-MCNT: 64 UG/G (ref 0–120)

## 2023-05-11 NOTE — TELEPHONE ENCOUNTER
Caller: Sara Michaels    Relationship: Self    Best call back number: [phone]    What is the best time to reach you:ANYTHING    Who are you requesting to speak with (clinical staff, provider,  specific staff member): CLINICAL    What was the call regarding: PATIENT STATED THAT HER RX FOR C-DIFF IS GOING TO COST HER $1200 AND SHE IS REQUESTING A DIFFERENT RX TO BE CALLED IN.     Do you require a callback: YES, IF NEEDED

## 2023-05-11 NOTE — TELEPHONE ENCOUNTER
Hub staff attempted to follow warm transfer process and was unsuccessful     Caller: Sara Michaels    Relationship to patient: Self    Best call back number: [phone] (Home)      Patient is needing: PT IS CALLING TALIB BACK REGARDING RX FOR C-DIFF.  PLEASE CALL PT BACK ASAP.

## 2023-05-12 ENCOUNTER — TELEPHONE (OUTPATIENT)
Dept: GASTROENTEROLOGY | Facility: CLINIC | Age: 68
End: 2023-05-12

## 2023-05-12 NOTE — TELEPHONE ENCOUNTER
Delia paperwork faxed to , confirmation rec'd.  Called [facility], difficid approved and will be sent to pt home via [employer] tomorrow. Faxed to pharmacy , confirmation rec'd.  Called pt and advised. Scanned under media.  Original copy mailed to [facility].

## 2023-05-12 NOTE — TELEPHONE ENCOUNTER
Caller: Sara Michaels    Relationship: Self    Best call back number: [phone]    What is the best time to reach you: ANYTIME    Who are you requesting to speak with (clinical staff, provider,  specific staff member): CLINICAL STAFF    Do you know the name of the person who called: RODGER MERRITT    What was the call regarding: RETURNING MISSED CALL    Do you require a callback: YES PLEASE

## 2023-05-26 ENCOUNTER — TELEPHONE (OUTPATIENT)
Dept: GASTROENTEROLOGY | Facility: CLINIC | Age: 68
End: 2023-05-26

## 2023-05-26 NOTE — TELEPHONE ENCOUNTER
Hub staff attempted to follow warm transfer process and was unsuccessful     Caller: Sara Michaels    Relationship to patient: Self    Best call back number: [phone]    Patient is needing: PATIENT REPORTS THAT SHE IS STILL HAVING DIARRHEA AFTER TAKING MEDICATION FOR C-DIFF, SHE MENTIONS THAT IT IS NOT AS BAD BUT STILL HAS A BAD ODOR. SHE WANTS TO KNOW IF SHE NEEDS TO BE SEEN OR IF SHE NEEDS TO DO ANOTHER STOOL SAMPLE. PLEASE CALL PATIENT BACK ASAP TO ADVISE.

## 2023-05-30 ENCOUNTER — TELEPHONE (OUTPATIENT)
Dept: GASTROENTEROLOGY | Facility: CLINIC | Age: 68
End: 2023-05-30

## 2023-05-30 NOTE — TELEPHONE ENCOUNTER
PT CALLED AND SAID THAT SHE RAN OUT OF HER MEDICATION THAT HELPED HER DIAHERRA.  SHE IS WANTING TO KNOW IF SHE NEEDS TO GET MORE OR IF SHE NEEDS AN APPT TO SEE THE

## 2023-05-31 NOTE — TELEPHONE ENCOUNTER
Call to patient and she stated that she ran out of the dificid last Thursday and she also said she has never had a prescription for Trulance.

## 2023-05-31 NOTE — TELEPHONE ENCOUNTER
If she completed Dificid last Thursday its not a good idea to repeat stool testing within 2 weeks of antibiotic therapy as she could get a false positive.

## 2023-06-01 NOTE — TELEPHONE ENCOUNTER
Call to patient and left vm (OK per  verbal release), per MICHAEL Martinez's results and recommendations.   Informed patient if they had questions or concerns to call back at [phone] option 1.

## 2023-06-21 NOTE — TELEPHONE ENCOUNTER
Called pt and left detailed msg on vm, (ok per BENI) advising of NEFTALI Coppola's note.  Advised to call back if any questions.     Labs ordered, sent to NEFTALI Coppola to cosign.     Stool kit placed at .    Azathioprine Pregnancy And Lactation Text: This medication is Pregnancy Category D and isn't considered safe during pregnancy. It is unknown if this medication is excreted in breast milk.

## 2023-09-14 ENCOUNTER — TELEPHONE (OUTPATIENT)
Dept: GASTROENTEROLOGY | Facility: CLINIC | Age: 68
End: 2023-09-14
Payer: MEDICARE

## 2023-09-22 ENCOUNTER — TELEPHONE (OUTPATIENT)
Dept: GASTROENTEROLOGY | Facility: CLINIC | Age: 68
End: 2023-09-22
Payer: MEDICARE

## 2023-10-31 ENCOUNTER — OFFICE VISIT (OUTPATIENT)
Dept: GASTROENTEROLOGY | Facility: CLINIC | Age: 68
End: 2023-10-31
Payer: MEDICARE

## 2023-10-31 VITALS
WEIGHT: 244.9 LBS | HEIGHT: 64 IN | BODY MASS INDEX: 41.81 KG/M2 | TEMPERATURE: 97.5 F | DIASTOLIC BLOOD PRESSURE: 81 MMHG | SYSTOLIC BLOOD PRESSURE: 136 MMHG | HEART RATE: 61 BPM

## 2023-10-31 DIAGNOSIS — R19.7 DIARRHEA, UNSPECIFIED TYPE: Primary | ICD-10-CM

## 2023-10-31 DIAGNOSIS — K21.9 GASTROESOPHAGEAL REFLUX DISEASE, UNSPECIFIED WHETHER ESOPHAGITIS PRESENT: ICD-10-CM

## 2023-10-31 PROCEDURE — 99214 OFFICE O/P EST MOD 30 MIN: CPT | Performed by: PHYSICIAN ASSISTANT

## 2023-10-31 PROCEDURE — 1160F RVW MEDS BY RX/DR IN RCRD: CPT | Performed by: PHYSICIAN ASSISTANT

## 2023-10-31 PROCEDURE — 1159F MED LIST DOCD IN RCRD: CPT | Performed by: PHYSICIAN ASSISTANT

## 2023-10-31 RX ORDER — PANTOPRAZOLE SODIUM 40 MG/1
40 TABLET, DELAYED RELEASE ORAL 2 TIMES DAILY
Qty: 180 TABLET | Refills: 3 | Status: SHIPPED | OUTPATIENT
Start: 2023-10-31

## 2023-10-31 RX ORDER — CHOLESTYRAMINE 4 G/9G
1 POWDER, FOR SUSPENSION ORAL 3 TIMES DAILY PRN
Qty: 60 PACKET | Refills: 2 | Status: SHIPPED | OUTPATIENT
Start: 2023-10-31

## 2023-10-31 RX ORDER — DULAGLUTIDE 3 MG/.5ML
INJECTION, SOLUTION SUBCUTANEOUS
COMMUNITY
Start: 2023-05-22

## 2023-10-31 NOTE — PROGRESS NOTES
"Chief Complaint  Abdominal Pain, Diarrhea, and Fecal Incontinence    Subjective          History Of Present Illness:    Sara Michaels is a  67 y.o. female patient of Dr. Cannon with a history of IBS.  She was last seen by KAVIN Martinez on 4/10/2023.  She has a history of fatty liver disease, gastritis, duodenal ulcers, colon polyps. Patient is new to me.    Patient had C. difficile in early May and was treated with Dificid.  Patient did not see much improvement with treatment at this time.    Patient continues to have diarrhea 7-10 times a day. Multiple episodes of incontinence. Complains of generalized abdominal pain. Pain improves some with bowel movements. No blood in the stool. Appetite is okay. Weight is stable.  Patient has tried Imodium intermittently for diarrhea and has not found it to be helpful.  She has a history of a cholecystectomy.  He was previously on Trulance for her IBS-C and is no longer taking this.    Patient does have a history of heartburn and is currently taking pantoprazole twice daily.  She occasionally has breakthrough heartburn.  She does not complain of any chest pain, dysphagia, odynophagia.    Additional data reviewed:  EGD 1/4/22 -acute gastritis, nonbleeding duodenal ulcers  C-scope 1/4/22 -TA polyps, diverticulosis, NBIH.  Recall 3 years    Objective   Vital Signs:   /81   Pulse 61   Temp 97.5 °F (36.4 °C)   Ht 162.6 cm (64\")   Wt 111 kg (244 lb 14.4 oz)   BMI 42.04 kg/m²       Physical Exam  Vitals reviewed.   Constitutional:       General: She is not in acute distress.     Appearance: Normal appearance. She is not ill-appearing.   HENT:      Head: Normocephalic and atraumatic.      Nose: Nose normal.      Mouth/Throat:      Pharynx: Oropharynx is clear.   Eyes:      Extraocular Movements: Extraocular movements intact.      Conjunctiva/sclera: Conjunctivae normal.      Pupils: Pupils are equal, round, and reactive to light.   Pulmonary:      Effort: Pulmonary " effort is normal.   Abdominal:      General: There is no distension.      Palpations: Abdomen is soft. There is no mass.      Tenderness: There is no abdominal tenderness.   Musculoskeletal:         General: No swelling. Normal range of motion.      Cervical back: Normal range of motion.   Skin:     General: Skin is warm and dry.      Findings: No bruising or rash.   Neurological:      General: No focal deficit present.      Mental Status: She is alert and oriented to person, place, and time.      Motor: No weakness.      Gait: Gait normal.   Psychiatric:         Mood and Affect: Mood normal.          Result Review :   The following data was reviewed by: Adalgisa Davis PA-C on 10/31/2023:  CMP          4/10/2023    09:20 4/20/2023    17:12   CMP   Glucose 176     BUN 6     Creatinine 0.64  1.00    Sodium 138     Potassium 3.4     Chloride 95     Calcium 8.9     Total Protein 5.9     Albumin 3.6     Globulin 2.3     Total Bilirubin 0.4     Alkaline Phosphatase 137     AST (SGOT) 6     ALT (SGPT) 8     BUN/Creatinine Ratio 9.4       CBC          4/10/2023    09:20   CBC   WBC 7.60    RBC 4.50    Hemoglobin 12.3    Hematocrit 37.7    MCV 83.8    MCH 27.3    MCHC 32.6    RDW 13.3    Platelets 302            Assessment and Plan    Diagnoses and all orders for this visit:    1. Diarrhea, unspecified type (Primary)  -     Clostridioides difficile Toxin, PCR - Stool, Per Rectum  -     Comprehensive Metabolic Panel  -     CBC & Differential  -     Calprotectin, Fecal - Stool, Per Rectum  -     Giardia / Cryptosporidium Screen - Stool, Per Rectum  -     Fecal Fat, Quantitative - Stool, Per Rectum  -     Ova & Parasite Examination - Stool, Per Rectum  -     Thyroid Panel With TSH  -     Stool Culture (Reference Lab) - Stool, Per Rectum  -     cholestyramine (Questran) 4 g packet; Take 1 packet by mouth 3 (Three) Times a Day As Needed (Diarrha).  Dispense: 60 packet; Refill: 2    2. Gastroesophageal reflux disease,  unspecified whether esophagitis present  -     pantoprazole (PROTONIX) 40 MG EC tablet; Take 1 tablet by mouth 2 (Two) Times a Day.  Dispense: 180 tablet; Refill: 3       Patient with ongoing diarrhea since treatment of C. difficile back in May.  We will recheck labs and stool studies for further evaluation.  Patient provided with cholestyramine.  Recommend she start with 1 packet daily for a week and if this does not work then increased to twice daily, up to 3 times daily.  Recommend she stay well-hydrated.  Colonoscopy up-to-date and no evidence of microscopic colitis.    Follow Up   Return in about 8 weeks (around 12/26/2023) for Adalgisa Urias PA-C.    Dragon dictation used throughout this note.            Adalgisa Urias PA-C   [facility]  [address] [place] [address]  Office: [phone]

## 2023-11-01 ENCOUNTER — TELEPHONE (OUTPATIENT)
Dept: GASTROENTEROLOGY | Facility: CLINIC | Age: 68
End: 2023-11-01
Payer: MEDICARE

## 2023-11-01 LAB
ALBUMIN SERPL-MCNC: 4.2 G/DL (ref 3.5–5.2)
ALBUMIN/GLOB SERPL: 2 G/DL
ALP SERPL-CCNC: 140 U/L (ref 39–117)
ALT SERPL-CCNC: 8 U/L (ref 1–33)
AST SERPL-CCNC: 10 U/L (ref 1–32)
BASOPHILS # BLD AUTO: 0.06 10*3/MM3 (ref 0–0.2)
BASOPHILS NFR BLD AUTO: 0.7 % (ref 0–1.5)
BILIRUB SERPL-MCNC: 0.4 MG/DL (ref 0–1.2)
BUN SERPL-MCNC: 14 MG/DL (ref 8–23)
BUN/CREAT SERPL: 22.6 (ref 7–25)
CALCIUM SERPL-MCNC: 9.5 MG/DL (ref 8.6–10.5)
CHLORIDE SERPL-SCNC: 94 MMOL/L (ref 98–107)
CO2 SERPL-SCNC: 28.8 MMOL/L (ref 22–29)
CREAT SERPL-MCNC: 0.62 MG/DL (ref 0.57–1)
EGFRCR SERPLBLD CKD-EPI 2021: 97.1 ML/MIN/1.73
EOSINOPHIL # BLD AUTO: 0.3 10*3/MM3 (ref 0–0.4)
EOSINOPHIL NFR BLD AUTO: 3.6 % (ref 0.3–6.2)
ERYTHROCYTE [DISTWIDTH] IN BLOOD BY AUTOMATED COUNT: 14.7 % (ref 12.3–15.4)
FT4I SERPL CALC-MCNC: 2.7 (ref 1.2–4.9)
GLOBULIN SER CALC-MCNC: 2.1 GM/DL
GLUCOSE SERPL-MCNC: 284 MG/DL (ref 65–99)
HCT VFR BLD AUTO: 41.4 % (ref 34–46.6)
HGB BLD-MCNC: 13.1 G/DL (ref 12–15.9)
IMM GRANULOCYTES # BLD AUTO: 0.03 10*3/MM3 (ref 0–0.05)
IMM GRANULOCYTES NFR BLD AUTO: 0.4 % (ref 0–0.5)
LYMPHOCYTES # BLD AUTO: 2.42 10*3/MM3 (ref 0.7–3.1)
LYMPHOCYTES NFR BLD AUTO: 29.4 % (ref 19.6–45.3)
MCH RBC QN AUTO: 27 PG (ref 26.6–33)
MCHC RBC AUTO-ENTMCNC: 31.6 G/DL (ref 31.5–35.7)
MCV RBC AUTO: 85.4 FL (ref 79–97)
MONOCYTES # BLD AUTO: 0.45 10*3/MM3 (ref 0.1–0.9)
MONOCYTES NFR BLD AUTO: 5.5 % (ref 5–12)
NEUTROPHILS # BLD AUTO: 4.96 10*3/MM3 (ref 1.7–7)
NEUTROPHILS NFR BLD AUTO: 60.4 % (ref 42.7–76)
NRBC BLD AUTO-RTO: 0 /100 WBC (ref 0–0.2)
PLATELET # BLD AUTO: 293 10*3/MM3 (ref 140–450)
POTASSIUM SERPL-SCNC: 5.2 MMOL/L (ref 3.5–5.2)
PROT SERPL-MCNC: 6.3 G/DL (ref 6–8.5)
RBC # BLD AUTO: 4.85 10*6/MM3 (ref 3.77–5.28)
SODIUM SERPL-SCNC: 134 MMOL/L (ref 136–145)
T3RU NFR SERPL: 33 % (ref 24–39)
T4 SERPL-MCNC: 8.2 UG/DL (ref 4.5–12)
TSH SERPL DL<=0.005 MIU/L-ACNC: 4.14 UIU/ML (ref 0.45–4.5)
WBC # BLD AUTO: 8.22 10*3/MM3 (ref 3.4–10.8)

## 2023-11-01 NOTE — TELEPHONE ENCOUNTER
Patient called. Left VM(per BENI) advising as per Adalgisa's note. Advised as per Adalgisa's note. Advised to call back with questions.

## 2023-11-01 NOTE — TELEPHONE ENCOUNTER
----- Message from Adalgisa Urias PA-C sent at 11/1/2023 12:24 PM EDT -----  Please let the patient know that I reviewed her labs.  Of her liver numbers is slightly elevated but overall is stable over the last year.  Kidney function is appropriate.  Her thyroid function is normal and she has a normal hemoglobin and white blood cell count.  Will await her stool studies.

## 2023-11-01 NOTE — PROGRESS NOTES
Please let the patient know that I reviewed her labs.  Of her liver numbers is slightly elevated but overall is stable over the last year.  Kidney function is appropriate.  Her thyroid function is normal and she has a normal hemoglobin and white blood cell count.  Will await her stool studies.

## 2023-11-02 LAB — C DIFF TOX GENS STL QL NAA+PROBE: NEGATIVE

## 2023-11-06 ENCOUNTER — TELEPHONE (OUTPATIENT)
Dept: GASTROENTEROLOGY | Facility: CLINIC | Age: 68
End: 2023-11-06
Payer: MEDICARE

## 2023-11-06 LAB — CALPROTECTIN STL-MCNT: 7 UG/G (ref 0–120)

## 2023-11-06 NOTE — TELEPHONE ENCOUNTER
Call to patient per Adalgisa's results and recommendations.   Patient verbalized understanding       ----- Message from Adalgisa Urias PA-C sent at 11/6/2023  9:32 AM EST -----  Please call the patient let her know that her C. difficile and stool culture were both negative.

## 2023-11-07 ENCOUNTER — TELEPHONE (OUTPATIENT)
Dept: GASTROENTEROLOGY | Facility: CLINIC | Age: 68
End: 2023-11-07
Payer: MEDICARE

## 2023-11-07 NOTE — TELEPHONE ENCOUNTER
----- Message from Adalgisa Urias PA-C sent at 11/6/2023  2:50 PM EST -----  Fecal calprotectin unremarkable

## 2024-01-18 DIAGNOSIS — R19.7 DIARRHEA, UNSPECIFIED TYPE: ICD-10-CM

## 2024-01-18 RX ORDER — CHOLESTYRAMINE 4 G/9G
POWDER, FOR SUSPENSION ORAL
Qty: 60 EACH | Refills: 5 | Status: SHIPPED | OUTPATIENT
Start: 2024-01-18

## (undated) DEVICE — CANN O2 ETCO2 FITS ALL CONN CO2 SMPL A/ 7IN DISP LF

## (undated) DEVICE — LN SMPL CO2 SHTRM SD STREAM W/M LUER

## (undated) DEVICE — SENSR O2 OXIMAX FNGR A/ 18IN NONSTR

## (undated) DEVICE — KT ORCA ORCAPOD DISP STRL

## (undated) DEVICE — SNAR POLYP CAPTIVATOR RND STFF 2.4 240CM 10MM 1P/U

## (undated) DEVICE — BITEBLOCK OMNI BLOC

## (undated) DEVICE — ADAPT CLN BIOGUARD AIR/H2O DISP

## (undated) DEVICE — TUBING, SUCTION, 1/4" X 10', STRAIGHT: Brand: MEDLINE

## (undated) DEVICE — SINGLE-USE BIOPSY FORCEPS: Brand: RADIAL JAW 4

## (undated) DEVICE — THE SINGLE USE ETRAP – POLYP TRAP IS USED FOR SUCTION RETRIEVAL OF ENDOSCOPICALLY REMOVED POLYPS.: Brand: ETRAP